# Patient Record
Sex: FEMALE | Race: WHITE | Employment: UNEMPLOYED | URBAN - METROPOLITAN AREA
[De-identification: names, ages, dates, MRNs, and addresses within clinical notes are randomized per-mention and may not be internally consistent; named-entity substitution may affect disease eponyms.]

---

## 2022-05-31 ENCOUNTER — OFFICE VISIT (OUTPATIENT)
Dept: ORTHOPEDIC SURGERY | Age: 37
End: 2022-05-31
Payer: MEDICAID

## 2022-05-31 DIAGNOSIS — G56.03 CARPAL TUNNEL SYNDROME, BILATERAL: Primary | ICD-10-CM

## 2022-05-31 DIAGNOSIS — G56.21 CUBITAL TUNNEL SYNDROME ON RIGHT: ICD-10-CM

## 2022-05-31 PROCEDURE — 99203 OFFICE O/P NEW LOW 30 MIN: CPT | Performed by: PHYSICIAN ASSISTANT

## 2022-05-31 NOTE — PROGRESS NOTES
HPI: Deirdre Beltran (: 1985) is a 39 y.o. female, patient, here for evaluation of the following chief complaint(s): Patient presents with complaint of numbness and tingling in the right arm to the hand. She also complains of numbness and tingling in the left hand. The right side symptoms are much worse. She denies neck pain. Symptoms have been bothering her since . She denies injury/trauma. This is awakening her on a nightly basis. She was seen by her primary care physician. She has tried anti-inflammatories for 14 days straight with no relief. She has not tried braces at this point. No other complaints or concerns. Arm Pain (Right ) and Hand Pain (Left)       Vitals:  Ht 5' 7\" (1.702 m)   Wt 205 lb (93 kg)   BMI 32.11 kg/m²    Body mass index is 32.11 kg/m². No Known Allergies    No current outpatient medications on file. No current facility-administered medications for this visit. No past medical history on file. No past surgical history on file. No family history on file. Social History     Tobacco Use    Smoking status: Not on file    Smokeless tobacco: Not on file   Substance Use Topics    Alcohol use: Not on file    Drug use: Not on file        Review of Systems    Constitutional: No fevers, chills, night sweats, excessive fatigue or weight loss. Musculoskeletal: No joint pain, swelling or redness. No decreased range of motion. Neurologic: No headache, blurred vision, and no areas of focal weakness or numbness. Normal gait. No sensory problems. Respiratory: No dyspnea on exertion, orthopnea, chest pain, cough or hemoptysis.   Cardiovascular: No anginal chest pain, irregular heart beat, tachycardia, palpitations or orthopnea  Integumentary: No chronic rashes, inflammation, ulcerations, pruritus, petechiae, purpura, ecchymoses, or skin changes      Physical Exam    General: Alert, cooperative, no distress  Musculosketal: Right hand - Normal range of motion. Normal sensation. No erythema, edema or warmth to the joints. No cysts. Right elbow - Normal range of motion. Normal sensation. No erythema, edema or warmth to the joint. Positive Tinel's test  Right wrist - Normal range of motion. Normal sensation. No erythema, edema or warmth to the joints. No cysts. Positive Tinel's and Phalen's test.    Left hand - Normal range of motion. Normal sensation. No erythema, edema or warmth to the joints. No cysts. Left wrist - Normal range of motion. Normal sensation. No erythema, edema or warmth to the joints. No cysts. Positive Phalen's test, less Tinel's test  Neurologic:  CNII-XII intact, Normal strength, sensation, and reflexes throughout    Imaging:  None     ASSESSMENT/PLAN:  Below is the assessment and plan developed based on review of pertinent history, physical exam, labs, studies, and medications. Numbness and tingling in the right arm to the hand. She also complains of numbness and tingling in the left hand. The right side symptoms are much worse. She denies neck pain. Symptoms have been bothering her since December, 2021. She denies injury/trauma. This is awakening her on a nightly basis. She was seen by her primary care physician. She has tried anti-inflammatories for 14 days straight with no relief. She has not tried braces at this point. Clinical exam is consistent with bilateral carpal tunnel syndrome as well as possible cubital tunnel syndrome to the right. We will obtain a bilateral upper extremity EMG study for further evaluation. She will follow-up after the study. In the meantime, she will try wearing carpal tunnel braces during activity and sleep to see if this will help her with her symptoms. She will access the brace on her own. She deferred a prescription for steroid. 1. Carpal tunnel syndrome, bilateral  -     EMG TWO EXTREMITIES UPPER; Future  2.  Cubital tunnel syndrome on right  -     EMG TWO EXTREMITIES UPPER; Future      Return in about 4 weeks (around 6/28/2022). Dr. Trenton Kimbrough was available for immediate consult during this encounter. An electronic signature was used to authenticate this note.   -- KIRAN MilesC

## 2022-06-01 VITALS — BODY MASS INDEX: 32.18 KG/M2 | HEIGHT: 67 IN | WEIGHT: 205 LBS

## 2022-06-01 PROBLEM — G56.21 CUBITAL TUNNEL SYNDROME ON RIGHT: Status: ACTIVE | Noted: 2022-06-01

## 2022-06-01 PROBLEM — G56.03 CARPAL TUNNEL SYNDROME, BILATERAL: Status: ACTIVE | Noted: 2022-06-01

## 2022-07-22 NOTE — PROGRESS NOTES
HPI: Deirdre Beltran (: 1985) is a 39 y.o. female, patient, here for evaluation of the following chief complaint(s): Patient presents with complaint of numbness and tingling in the right arm to the hand. She also complains of numbness and tingling in the left hand. The right side symptoms are much worse. She denies neck pain. Symptoms have been bothering her since . She denies injury/trauma. This is awakening her on a nightly basis. She was seen by her primary care physician. She has tried anti-inflammatories for 14 days straight with no relief. She has not tried braces at this point. No other complaints or concerns. EMG assessment on 2022 did show moderate right and borderline left carpal tunnel. She states that she has to wear a brace on the right side but her symptoms are not severe enough in the left to warrant the brace yet they feel the same. She complains of nocturnal paresthesias worsen with driving and has to shake her hands free. No chief complaint on file. Vitals: There were no vitals taken for this visit. There is no height or weight on file to calculate BMI. No Known Allergies    No current outpatient medications on file. No current facility-administered medications for this visit. History reviewed. No pertinent past medical history. History reviewed. No pertinent surgical history. History reviewed. No pertinent family history. Review of Systems   All other systems reviewed and are negative. Constitutional: No fevers, chills, night sweats, excessive fatigue or weight loss. Musculoskeletal: No joint pain, swelling or redness. No decreased range of motion. Neurologic: No headache, blurred vision, and no areas of focal weakness or numbness. Normal gait. No sensory problems. Respiratory: No dyspnea on exertion, orthopnea, chest pain, cough or hemoptysis.   Cardiovascular: No anginal chest pain, irregular heart beat, tachycardia, palpitations or orthopnea  Integumentary: No chronic rashes, inflammation, ulcerations, pruritus, petechiae, purpura, ecchymoses, or skin changes      Physical Exam    General: Alert, cooperative, no distress  Musculosketal: Right hand - Normal range of motion. Normal sensation. No erythema, edema or warmth to the joints. No cysts. Right elbow - Normal range of motion. Normal sensation. No erythema, edema or warmth to the joint. Positive Tinel's test  Right wrist - Normal range of motion. Normal sensation. No erythema, edema or warmth to the joints. No cysts. Positive Tinel's and Phalen's test.    Left hand - Normal range of motion. Normal sensation. No erythema, edema or warmth to the joints. No cysts. Left wrist - Normal range of motion. Normal sensation. No erythema, edema or warmth to the joints. No cysts. Positive Phalen's test, less Tinel's test  Neurologic:  CNII-XII intact, Normal strength, sensation, and reflexes throughout    Imaging:  None     ASSESSMENT/PLAN:  Below is the assessment and plan developed based on review of pertinent history, physical exam, labs, studies, and medications. Numbness and tingling in the right arm to the hand. She also complains of numbness and tingling in the left hand. The right side symptoms are much worse. She denies neck pain. Symptoms have been bothering her since December, 2021. She denies injury/trauma. This is awakening her on a nightly basis. She was seen by her primary care physician. She has tried anti-inflammatories for 14 days straight with no relief. She has not tried braces at this point. Clinical exam is consistent with bilateral carpal tunnel syndrome. MD assessment on 7/19/2022 did confirm moderate right and borderline left carpal tunnel. She is having to adjust sleep positions and is wearing a brace on the right side but does not yet require it on the left.   She was offered but deferred injection therapy and at this stage prefers to proceed with a right wrist endoscopic carpal tunnel release. I reviewed risks that include but not limited to stiffness, pain, nerve or tendon damage and overall incomplete relief of pain and paresthesias. Arrangements can be made for this to be performed on an outpatient basis soon as possible. 1. Carpal tunnel syndrome, bilateral      Return for Follow-up 7 to 10 days after surgery. Kevon Hammonds

## 2022-07-25 DIAGNOSIS — G56.03 CARPAL TUNNEL SYNDROME, BILATERAL: ICD-10-CM

## 2022-07-25 DIAGNOSIS — G56.21 CUBITAL TUNNEL SYNDROME ON RIGHT: ICD-10-CM

## 2022-08-01 ENCOUNTER — OFFICE VISIT (OUTPATIENT)
Dept: ORTHOPEDIC SURGERY | Age: 37
End: 2022-08-01
Payer: MEDICAID

## 2022-08-01 DIAGNOSIS — G56.03 CARPAL TUNNEL SYNDROME, BILATERAL: Primary | ICD-10-CM

## 2022-08-01 PROCEDURE — 99213 OFFICE O/P EST LOW 20 MIN: CPT | Performed by: ORTHOPAEDIC SURGERY

## 2022-08-01 NOTE — PATIENT INSTRUCTIONS
Carpal Tunnel Syndrome: Care Instructions  Overview     Carpal tunnel syndrome is numbness, tingling, weakness, and pain in your hand, wrist, and sometimes forearm. It is caused by pressure on the median nerve. This nerve and several tough tissues called tendons run through a space in the wrist. This space is called the carpal tunnel. The repeated hand motions used in work and some hobbies and sports can put pressure on the median nerve. Pregnancy can cause carpal tunnel syndrome. Several conditions, such as diabetes, arthritis, and an underactive thyroid, can also cause it. You may be able to limit an activity or change the way you do it to reduce your symptoms. You also can take other steps to feel better. If your symptoms are mild, 1 to 2 weeks of home treatment are likely to ease your pain. Surgery is needed only if other treatments do not work. Follow-up care is a key part of your treatment and safety. Be sure to make and go to all appointments, and call your doctor if you are having problems. It's also a good idea to know your test results and keep a list of the medicines you take. How can you care for yourself at home? If possible, stop or reduce the activity that causes your symptoms. If you cannot stop the activity, take frequent breaks to rest and stretch or change hand positions to do a task. Try switching hands, such as when using a computer mouse. Try to avoid bending or twisting your wrists. Ask your doctor if you can take an over-the-counter pain medicine, such as acetaminophen (Tylenol), ibuprofen (Advil, Motrin), or naproxen (Aleve). Be safe with medicines. Read and follow all instructions on the label. If your doctor prescribes corticosteroid medicine to help reduce pain and swelling, take it exactly as prescribed. Call your doctor if you think you are having a problem with your medicine. Put ice or a cold pack on your wrist for 10 to 20 minutes at a time to ease pain.  Put a thin cloth between the ice and your skin. If your doctor or your physical or occupational therapist tells you to wear a wrist splint, wear it as directed to keep your wrist in a neutral position. This also eases pressure on your median nerve. Ask your doctor whether you should have physical or occupational therapy to learn how to do tasks differently. Try a yoga class to stretch your muscles and build strength in your hands and wrists. Yoga has been shown to ease carpal tunnel symptoms. To prevent carpal tunnel  When working at a computer, keep your hands and wrists in line with your forearms. Hold your elbows close to your sides. Take a break every 10 to 15 minutes. Try these exercises:  Warm up: Rotate your wrist up, down, and from side to side. Repeat this 4 times. Stretch your fingers far apart, relax them, then stretch them again. Repeat 4 times. Stretch your thumb by pulling it back gently, holding it, and then releasing it. Repeat 4 times. Prayer stretch: Start with your palms together in front of your chest just below your chin. Slowly lower your hands toward your waistline while keeping your hands close to your stomach and your palms together until you feel a mild to moderate stretch under your forearms. Hold for 10 to 20 seconds. Repeat 4 times. Wrist flexor stretch: Hold your arm in front of you with your palm up. Bend your wrist, pointing your hand toward the floor. With your other hand, gently bend your wrist further until you feel a mild to moderate stretch in your forearm. Hold for 10 to 20 seconds. Repeat 4 times. Wrist extensor stretch: Repeat the steps for the wrist flexor stretch, but begin with your extended hand palm down. Squeeze a rubber exercise ball several times a day to keep your hands and fingers strong. Avoid holding objects (such as a book) in one position for a long time. When possible, use your whole hand to grasp an object.  Using just the thumb and index finger can put stress on the wrist.  Do not smoke. It can make this condition worse by reducing blood flow to the median nerve. If you need help quitting, talk to your doctor about stop-smoking programs and medicines. These can increase your chances of quitting for good. When should you call for help? Watch closely for changes in your health, and be sure to contact your doctor if:    Your pain or other problems do not get better with home care. You want more information about physical or occupational therapy. You have side effects of your corticosteroid medicine, such as:  Weight gain. Mood changes. Trouble sleeping. Bruising easily. You have any other problems with your medicine. Where can you learn more? Go to http://www.gray.com/  Enter R432 in the search box to learn more about \"Carpal Tunnel Syndrome: Care Instructions. \"  Current as of: July 1, 2021               Content Version: 13.2  © 9748-7354 [employer]. Care instructions adapted under license by [employer] (which disclaims liability or warranty for this information). If you have questions about a medical condition or this instruction, always ask your healthcare professional. [address] any warranty or liability for your use of this information.

## 2022-08-10 DIAGNOSIS — G56.21 CUBITAL TUNNEL SYNDROME ON RIGHT: Primary | ICD-10-CM

## 2022-08-25 DIAGNOSIS — G56.21 CUBITAL TUNNEL SYNDROME ON RIGHT: Primary | ICD-10-CM

## 2022-08-25 RX ORDER — HYDROCODONE BITARTRATE AND ACETAMINOPHEN 5; 325 MG/1; MG/1
1 TABLET ORAL
Qty: 15 TABLET | Refills: 0 | Status: SHIPPED | OUTPATIENT
Start: 2022-08-25 | End: 2022-09-01

## 2022-09-07 ENCOUNTER — OFFICE VISIT (OUTPATIENT)
Dept: ORTHOPEDIC SURGERY | Age: 37
End: 2022-09-07
Payer: MEDICAID

## 2022-09-07 DIAGNOSIS — Z98.890 STATUS POST CARPAL TUNNEL RELEASE: ICD-10-CM

## 2022-09-07 DIAGNOSIS — G56.03 CARPAL TUNNEL SYNDROME, BILATERAL: Primary | ICD-10-CM

## 2022-09-07 PROCEDURE — 99024 POSTOP FOLLOW-UP VISIT: CPT | Performed by: ORTHOPAEDIC SURGERY

## 2022-09-07 NOTE — PROGRESS NOTES
HPI: Deirdre Beltran (: 1985) is a 39 y.o. female, patient, here for evaluation of the following chief complaint(s): Patient presents with complaint of numbness and tingling in the right arm to the hand. She also complains of numbness and tingling in the left hand. The right side symptoms are much worse. She denies neck pain. Symptoms have been bothering her since . She denies injury/trauma. This is awakening her on a nightly basis. She was seen by her primary care physician. She has tried anti-inflammatories for 14 days straight with no relief. She has not tried braces at this point. No other complaints or concerns. EMG assessment on 2022 did show moderate right and borderline left carpal tunnel. She states that she has to wear a brace on the right side but her symptoms are not severe enough in the left to warrant the brace yet they feel the same. She complains of nocturnal paresthesias worsen with driving and has to shake her hands free. She next underwent a right endoscopic carpal tunnel release on 2022. No chief complaint on file. Vitals: There were no vitals taken for this visit. There is no height or weight on file to calculate BMI. No Known Allergies    No current outpatient medications on file. No current facility-administered medications for this visit. No past medical history on file. No past surgical history on file. No family history on file. Review of Systems   All other systems reviewed and are negative. Constitutional: No fevers, chills, night sweats, excessive fatigue or weight loss. Musculoskeletal: No joint pain, swelling or redness. No decreased range of motion. Neurologic: No headache, blurred vision, and no areas of focal weakness or numbness. Normal gait. No sensory problems. Respiratory: No dyspnea on exertion, orthopnea, chest pain, cough or hemoptysis.   Cardiovascular: No anginal chest pain, irregular heart beat, tachycardia, palpitations or orthopnea  Integumentary: No chronic rashes, inflammation, ulcerations, pruritus, petechiae, purpura, ecchymoses, or skin changes      Physical Exam    Overall the wound is healing well there is no redness drainage or sign infection. Good early motion and improved sensibility. Imaging:  None     ASSESSMENT/PLAN:  Below is the assessment and plan developed based on review of pertinent history, physical exam, labs, studies, and medications. Numbness and tingling in the right arm to the hand. She also complains of numbness and tingling in the left hand. The right side symptoms are much worse. She denies neck pain. Symptoms have been bothering her since December, 2021. She denies injury/trauma. This is awakening her on a nightly basis. She was seen by her primary care physician. She has tried anti-inflammatories for 14 days straight with no relief. She has not tried braces at this point. Clinical exam is consistent with bilateral carpal tunnel syndrome. MD assessment on 7/19/2022 did confirm moderate right and borderline left carpal tunnel. She is having to adjust sleep positions and is wearing a brace on the right side but does not yet require it on the left. She was offered but deferred injection therapy and at this stage prefers to proceed with a right wrist endoscopic carpal tunnel release. She underwent a right endoscopic carpal tunnel release on 8/26/2022. He has improved sensation is very pleased with her recovery. She currently has really no complaints on the left side which tested borderline. Continue with simple wound care, gentle motion and strength. Follow-up in 3 to 4 weeks. 1. Carpal tunnel syndrome, bilateral  2. Status post carpal tunnel release      No follow-ups on file.

## 2022-09-07 NOTE — PATIENT INSTRUCTIONS
Wrist: Exercises  Introduction  Here are some examples of exercises for you to try. The exercises may be suggested for a condition or for rehabilitation. Start each exercise slowly. Ease off the exercises if you start to have pain. You will be told when to start these exercises and which ones will work best for you. How to do the exercises  Prayer stretch    Start with your palms together in front of your chest just below your chin. Slowly lower your hands toward your waistline, keeping your hands close to your stomach and your palms together until you feel a mild to moderate stretch under your forearms. Hold for at least 15 to 30 seconds. Repeat 2 to 4 times. Wrist flexor stretch    Extend your arm in front of you with your palm up. Bend your wrist, pointing your hand toward the floor. With your other hand, gently bend your wrist farther until you feel a mild to moderate stretch in your forearm. Hold for at least 15 to 30 seconds. Repeat 2 to 4 times. Wrist extensor stretch    Repeat steps 1 through 4 of the stretch above, but begin with your extended hand palm down. Follow-up care is a key part of your treatment and safety. Be sure to make and go to all appointments, and call your doctor if you are having problems. It's also a good idea to know your test results and keep a list of the medicines you take. Where can you learn more? Go to http://www.gray.com/  Enter Z598 in the search box to learn more about \"Wrist: Exercises. \"  Current as of: July 1, 2021               Content Version: 13.2  © 1272-6538 [employer]wise, [employer]. Care instructions adapted under license by [employer] (which disclaims liability or warranty for this information). If you have questions about a medical condition or this instruction, always ask your healthcare professional. [place] [address] any warranty or liability for your use of this information.